# Patient Record
Sex: FEMALE | Race: WHITE | Employment: FULL TIME | URBAN - METROPOLITAN AREA
[De-identification: names, ages, dates, MRNs, and addresses within clinical notes are randomized per-mention and may not be internally consistent; named-entity substitution may affect disease eponyms.]

---

## 2019-11-04 LAB — PAP SMEAR, EXTERNAL: NEGATIVE

## 2020-06-30 ENCOUNTER — OP HISTORICAL/CONVERTED ENCOUNTER (OUTPATIENT)
Dept: OTHER | Age: 50
End: 2020-06-30

## 2020-07-08 ENCOUNTER — OP HISTORICAL/CONVERTED ENCOUNTER (OUTPATIENT)
Dept: OTHER | Age: 50
End: 2020-07-08

## 2020-08-10 VITALS
HEIGHT: 65 IN | SYSTOLIC BLOOD PRESSURE: 120 MMHG | DIASTOLIC BLOOD PRESSURE: 70 MMHG | BODY MASS INDEX: 30.89 KG/M2 | WEIGHT: 185.4 LBS

## 2020-08-10 PROBLEM — G47.00 INSOMNIA: Status: ACTIVE | Noted: 2020-08-10

## 2020-08-10 PROBLEM — E66.9 OBESITY: Status: ACTIVE | Noted: 2020-08-10

## 2020-11-05 VITALS
SYSTOLIC BLOOD PRESSURE: 120 MMHG | BODY MASS INDEX: 30.89 KG/M2 | HEIGHT: 65 IN | WEIGHT: 185.4 LBS | DIASTOLIC BLOOD PRESSURE: 70 MMHG

## 2020-11-05 RX ORDER — PANTOPRAZOLE SODIUM 40 MG/1
40 TABLET, DELAYED RELEASE ORAL DAILY
COMMUNITY

## 2020-11-05 RX ORDER — ZOLPIDEM TARTRATE 10 MG/1
TABLET ORAL
COMMUNITY
End: 2021-12-20

## 2020-11-05 RX ORDER — ONDANSETRON HYDROCHLORIDE 8 MG/1
8 TABLET, FILM COATED ORAL
COMMUNITY
End: 2021-12-20

## 2020-11-05 RX ORDER — LANOLIN ALCOHOL/MO/W.PET/CERES
CREAM (GRAM) TOPICAL
COMMUNITY
End: 2021-12-20

## 2020-11-05 RX ORDER — AMLODIPINE BESYLATE 10 MG/1
TABLET ORAL DAILY
COMMUNITY
End: 2021-12-20

## 2020-11-05 RX ORDER — ESCITALOPRAM OXALATE 20 MG/1
20 TABLET ORAL DAILY
COMMUNITY

## 2020-11-05 RX ORDER — MONTELUKAST SODIUM 10 MG/1
10 TABLET ORAL DAILY
COMMUNITY

## 2020-11-05 RX ORDER — ONDANSETRON 4 MG/1
4 TABLET, ORALLY DISINTEGRATING ORAL
COMMUNITY
End: 2021-12-20

## 2021-12-20 ENCOUNTER — OFFICE VISIT (OUTPATIENT)
Dept: OBGYN CLINIC | Age: 51
End: 2021-12-20

## 2021-12-20 VITALS
WEIGHT: 216.5 LBS | BODY MASS INDEX: 36.07 KG/M2 | HEIGHT: 65 IN | SYSTOLIC BLOOD PRESSURE: 124 MMHG | DIASTOLIC BLOOD PRESSURE: 72 MMHG

## 2021-12-20 DIAGNOSIS — Z12.4 SCREENING FOR MALIGNANT NEOPLASM OF CERVIX: Primary | ICD-10-CM

## 2021-12-20 DIAGNOSIS — Z01.419 ROUTINE GYNECOLOGICAL EXAMINATION: ICD-10-CM

## 2021-12-20 DIAGNOSIS — N95.1 MENOPAUSAL SYNDROME: ICD-10-CM

## 2021-12-20 PROCEDURE — 99396 PREV VISIT EST AGE 40-64: CPT | Performed by: OBSTETRICS & GYNECOLOGY

## 2021-12-20 NOTE — PROGRESS NOTES
Corry Boyer is a G2 , 46 y.o. female   Patient's last menstrual period was 03/01/2021 (approximate). She presents for her annual    She is having no significant problems. Menstrual status:  Cycles are menopause    Flow: absent      She does not have dysmenorrhea. Medical conditions:  Since her last annual GYN exam about one year ago, she has not the following changes in her health history: none. Mammogram History:    SARAH Results (most recent):  No results found for this or any previous visit. DEXA Results (most recent):  No results found for this or any previous visit. Past Medical History:   Diagnosis Date    Anemia     Asthma     H/O breast biopsy     Hypertension     Insomnia     Nausea & vomiting     Restless leg syndrome      Past Surgical History:   Procedure Laterality Date    HX ABDOMINOPLASTY      HX BREAST BIOPSY      HX CHOLECYSTECTOMY      HX GASTRIC BYPASS      HX GYN      2 C -sections    HX GYN      BTL    HX HERNIA REPAIR      HX OTHER SURGICAL      abdominoplasty    NM ABDOMEN SURGERY PROC UNLISTED      gastric bypass    NM ABDOMEN SURGERY PROC UNLISTED      2 umbilical hernia repairs    NM BREAST SURGERY PROCEDURE UNLISTED      left breast lumpectomy       Prior to Admission medications    Medication Sig Start Date End Date Taking? Authorizing Provider   escitalopram oxalate (LEXAPRO) 20 mg tablet Take 20 mg by mouth daily. Yes Provider, Historical   montelukast (SINGULAIR) 10 mg tablet Take 10 mg by mouth daily. Yes Provider, Historical   pantoprazole (PROTONIX) 40 mg tablet Take 40 mg by mouth daily. Yes Provider, Historical   albuterol (PROVENTIL, VENTOLIN) 90 mcg/Actuation inhaler Take 2 Puffs by inhalation every six (6) hours as needed. Yes Provider, Historical   pramipexole (MIRAPEX) 0.125 mg tablet Take 0.125 mg by mouth nightly. Yes Provider, Historical   lisinopril (PRINIVIL, ZESTRIL) 20 mg tablet Take  by mouth daily.    Yes Provider, Historical   Hydrochlorothiazide 12.5 mg tablet Take 12.5 mg by mouth daily. Yes Provider, Historical   CYANOCOBALAMIN, VITAMIN B-12, (VITAMIN B-12 PO) Take  by mouth daily. Yes Provider, Historical   ondansetron hcl (ZOFRAN) 8 mg tablet Take 8 mg by mouth every eight (8) hours as needed for Nausea or Vomiting. Patient not taking: Reported on 12/20/2021    Provider, Historical       Allergies   Allergen Reactions    Codeine Itching          Tobacco History:  reports that she has never smoked. She has never used smokeless tobacco.  Alcohol Abuse:  reports current alcohol use of about 0.8 standard drinks of alcohol per week. Drug Abuse:  reports no history of drug use. Family Medical/Cancer History:   Family History   Problem Relation Age of Onset    Post-op Nausea/Vomiting Mother     Diabetes Mother     Heart Disease Father     Hypertension Father     Elevated Lipids Father           Review of Systems   Constitutional: Negative for chills, fever, malaise/fatigue and weight loss. HENT: Negative for congestion, ear pain, sinus pain and tinnitus. Eyes: Negative for blurred vision and double vision. Respiratory: Negative for cough, shortness of breath and wheezing. Cardiovascular: Negative for chest pain and palpitations. Gastrointestinal: Negative for abdominal pain, blood in stool, constipation, diarrhea, heartburn, nausea and vomiting. Genitourinary: Negative for dysuria, flank pain, frequency, hematuria and urgency. Musculoskeletal: Negative for joint pain and myalgias. Skin: Negative for itching and rash. Neurological: Negative for dizziness, weakness and headaches. Psychiatric/Behavioral: Negative for depression, memory loss and suicidal ideas. The patient is not nervous/anxious and does not have insomnia. Physical Exam  Constitutional:       Appearance: Normal appearance. HENT:      Head: Normocephalic and atraumatic.    Cardiovascular:      Rate and Rhythm: Normal rate. Heart sounds: Normal heart sounds. Pulmonary:      Effort: Pulmonary effort is normal.      Breath sounds: Normal breath sounds. Chest:   Breasts:      Right: Normal.      Left: Normal.       Abdominal:      General: Abdomen is flat. Palpations: Abdomen is soft. Genitourinary:     General: Normal vulva. Vagina: Normal.      Cervix: Normal.      Uterus: Normal.       Adnexa: Right adnexa normal and left adnexa normal.      Rectum: Normal.      Comments: PAP Obtained  Neurological:      Mental Status: She is alert. Psychiatric:         Mood and Affect: Mood normal.         Behavior: Behavior normal.         Thought Content: Thought content normal.          Visit Vitals  /72 (BP 1 Location: Left upper arm, BP Patient Position: Sitting, BP Cuff Size: Large adult)   Ht 5' 4.5\" (1.638 m)   Wt 216 lb 8 oz (98.2 kg)   LMP 03/01/2021 (Approximate)   BMI 36.59 kg/m²         Assessment:   Diagnoses and all orders for this visit:    1. Screening for malignant neoplasm of cervix  -     PAP IG, RFX APTIMA HPV ASCUS ([id number])    2. Routine gynecological examination  -     PAP IG, RFX APTIMA HPV ASCUS ([id number])    3. Menopausal syndrome    4. BMI 36.0-36.9,adult        Plan: Questions addressed  Counseled re: diet, exercise, healthy lifestyle  Return for Annual  Rec annual mammogram        Follow-up and Dispositions    · Return for 1 yr annual, 1 yr mammo.

## 2021-12-20 NOTE — PROGRESS NOTES
Chief Complaint   Patient presents with    Annual Exam     mammo done here today     Visit Vitals  /72 (BP 1 Location: Left upper arm, BP Patient Position: Sitting, BP Cuff Size: Large adult)   Ht 5' 4.5\" (1.638 m)   Wt 216 lb 8 oz (98.2 kg)   LMP 03/01/2021 (Approximate)   BMI 36.59 kg/m²

## 2021-12-22 LAB
CYTOLOGIST CVX/VAG CYTO: NORMAL
CYTOLOGY CVX/VAG DOC CYTO: NORMAL
CYTOLOGY CVX/VAG DOC THIN PREP: NORMAL
DX ICD CODE: NORMAL
LABCORP, 190119: NORMAL
Lab: NORMAL
OTHER STN SPEC: NORMAL
STAT OF ADQ CVX/VAG CYTO-IMP: NORMAL

## 2022-03-18 PROBLEM — E66.9 OBESITY: Status: ACTIVE | Noted: 2020-08-10

## 2022-03-19 PROBLEM — G47.00 INSOMNIA: Status: ACTIVE | Noted: 2020-08-10

## 2022-03-29 RX ORDER — ESTRADIOL AND NORETHINDRONE ACETATE .5; .1 MG/1; MG/1
1 TABLET ORAL DAILY
Qty: 90 TABLET | Refills: 1 | Status: SHIPPED | OUTPATIENT
Start: 2022-03-29 | End: 2022-10-10

## 2023-06-21 VITALS — BODY MASS INDEX: 36.59 KG/M2 | HEIGHT: 65 IN

## 2023-07-05 ENCOUNTER — OFFICE VISIT (OUTPATIENT)
Age: 53
End: 2023-07-05
Payer: COMMERCIAL

## 2023-07-05 VITALS
HEIGHT: 65 IN | BODY MASS INDEX: 31.34 KG/M2 | WEIGHT: 188.13 LBS | DIASTOLIC BLOOD PRESSURE: 77 MMHG | SYSTOLIC BLOOD PRESSURE: 118 MMHG

## 2023-07-05 DIAGNOSIS — Z12.4 PAP SMEAR FOR CERVICAL CANCER SCREENING: Primary | ICD-10-CM

## 2023-07-05 DIAGNOSIS — Z01.419 GYNECOLOGIC EXAM NORMAL: ICD-10-CM

## 2023-07-05 PROCEDURE — 99396 PREV VISIT EST AGE 40-64: CPT | Performed by: OBSTETRICS & GYNECOLOGY

## 2023-07-05 RX ORDER — ESTRADIOL AND NORETHINDRONE ACETATE .5; .1 MG/1; MG/1
1 TABLET ORAL DAILY
Qty: 90 TABLET | Refills: 3 | Status: SHIPPED | OUTPATIENT
Start: 2023-07-05

## 2023-07-05 ASSESSMENT — ENCOUNTER SYMPTOMS
RESPIRATORY NEGATIVE: 1
GASTROINTESTINAL NEGATIVE: 1

## 2023-07-05 NOTE — PROGRESS NOTES
Ludwin Boyce is a No obstetric history on file. , 45 y.o. female   No LMP recorded. Patient is postmenopausal.    She presents for her annual    She is having no significant problems. Menstrual status:  Cycles are menopausal.    Flow: absent. She does not have dysmenorrhea. Medical conditions:  Since her last annual GYN exam about one year ago, she has not the following changes in her health history: none. Mammogram History:    MIKE Results (most recent):  @[employer] [facility]([id number])@     DEXA Results (most recent):  @[employer]([id number])@       Past Medical History:   Diagnosis Date    Anemia     Asthma     H/O breast biopsy     Hypertension     Insomnia     Nausea & vomiting     Restless leg syndrome      Past Surgical History:   Procedure Laterality Date    ABDOMINOPLASTY      BREAST BIOPSY      BREAST SURGERY      left breast lumpectomy    CHOLECYSTECTOMY      GASTRIC BYPASS SURGERY      GYN      BTL    GYN      2 C -sections    HERNIA REPAIR      OTHER SURGICAL HISTORY      abdominoplasty    UT UNLISTED PROCEDURE ABDOMEN PERITONEUM & OMENTUM      gastric bypass    UT UNLISTED PROCEDURE ABDOMEN PERITONEUM & OMENTUM      2 umbilical hernia repairs       Prior to Admission medications    Medication Sig Start Date End Date Taking? Authorizing Provider   Estradiol-Norethindrone Acet 0.5-0.1 MG TABS Take 1 tablet by mouth daily 10/10/22  Yes Ar Automatic Reconciliation       Allergies   Allergen Reactions    Codeine Itching          Tobacco History:  reports that she has never smoked. She has never used smokeless tobacco.  Alcohol use:  reports current alcohol use of about 0.8 standard drinks per week. Drug use:  reports no history of drug use.     Family Medical/Cancer History:   Family History   Problem Relation Age of Onset    Elevated Lipids Father     Diabetes Mother     Heart Disease Father     Hypertension Father     Post-op Nausea/Vomiting Mother         Review of Systems

## 2023-07-07 LAB
., LABCORP: NORMAL
CYTOLOGIST CVX/VAG CYTO: NORMAL
CYTOLOGY CVX/VAG DOC CYTO: NORMAL
CYTOLOGY CVX/VAG DOC THIN PREP: NORMAL
DX ICD CODE: NORMAL
Lab: NORMAL
OTHER STN SPEC: NORMAL
STAT OF ADQ CVX/VAG CYTO-IMP: NORMAL

## 2024-08-12 RX ORDER — ESTRADIOL AND NORETHINDRONE ACETATE .5; .1 MG/1; MG/1
1 TABLET ORAL DAILY
Qty: 84 TABLET | Refills: 0 | Status: SHIPPED | OUTPATIENT
Start: 2024-08-12

## 2024-11-04 RX ORDER — ESTRADIOL AND NORETHINDRONE ACETATE .5; .1 MG/1; MG/1
1 TABLET ORAL DAILY
Qty: 84 TABLET | Refills: 0 | OUTPATIENT
Start: 2024-11-04